# Patient Record
Sex: FEMALE | Race: WHITE | NOT HISPANIC OR LATINO
[De-identification: names, ages, dates, MRNs, and addresses within clinical notes are randomized per-mention and may not be internally consistent; named-entity substitution may affect disease eponyms.]

---

## 2019-11-02 ENCOUNTER — APPOINTMENT (OUTPATIENT)
Dept: ORTHOPEDIC SURGERY | Facility: CLINIC | Age: 64
End: 2019-11-02

## 2020-12-03 ENCOUNTER — TRANSCRIPTION ENCOUNTER (OUTPATIENT)
Age: 65
End: 2020-12-03

## 2020-12-16 ENCOUNTER — TRANSCRIPTION ENCOUNTER (OUTPATIENT)
Age: 65
End: 2020-12-16

## 2021-01-25 ENCOUNTER — APPOINTMENT (OUTPATIENT)
Dept: DISASTER EMERGENCY | Facility: CLINIC | Age: 66
End: 2021-01-25

## 2021-01-27 LAB — SARS-COV-2 N GENE NPH QL NAA+PROBE: NOT DETECTED

## 2021-02-13 ENCOUNTER — TRANSCRIPTION ENCOUNTER (OUTPATIENT)
Age: 66
End: 2021-02-13

## 2021-03-04 ENCOUNTER — TRANSCRIPTION ENCOUNTER (OUTPATIENT)
Age: 66
End: 2021-03-04

## 2021-09-13 ENCOUNTER — APPOINTMENT (OUTPATIENT)
Dept: ULTRASOUND IMAGING | Facility: CLINIC | Age: 66
End: 2021-09-13
Payer: MEDICARE

## 2021-09-13 ENCOUNTER — APPOINTMENT (OUTPATIENT)
Dept: MAMMOGRAPHY | Facility: CLINIC | Age: 66
End: 2021-09-13
Payer: MEDICARE

## 2021-09-13 PROCEDURE — 77067 SCR MAMMO BI INCL CAD: CPT

## 2021-09-13 PROCEDURE — 77063 BREAST TOMOSYNTHESIS BI: CPT

## 2021-09-13 PROCEDURE — 76641 ULTRASOUND BREAST COMPLETE: CPT | Mod: 50

## 2021-09-25 ENCOUNTER — TRANSCRIPTION ENCOUNTER (OUTPATIENT)
Age: 66
End: 2021-09-25

## 2022-09-07 ENCOUNTER — APPOINTMENT (OUTPATIENT)
Dept: ULTRASOUND IMAGING | Facility: CLINIC | Age: 67
End: 2022-09-07

## 2022-09-07 PROCEDURE — 76856 US EXAM PELVIC COMPLETE: CPT

## 2022-09-07 PROCEDURE — 76830 TRANSVAGINAL US NON-OB: CPT

## 2022-09-08 ENCOUNTER — APPOINTMENT (OUTPATIENT)
Dept: MAMMOGRAPHY | Facility: CLINIC | Age: 67
End: 2022-09-08

## 2022-09-08 ENCOUNTER — APPOINTMENT (OUTPATIENT)
Dept: ULTRASOUND IMAGING | Facility: CLINIC | Age: 67
End: 2022-09-08

## 2022-09-08 ENCOUNTER — APPOINTMENT (OUTPATIENT)
Dept: RADIOLOGY | Facility: CLINIC | Age: 67
End: 2022-09-08

## 2022-09-08 PROCEDURE — 77067 SCR MAMMO BI INCL CAD: CPT

## 2022-09-08 PROCEDURE — 77080 DXA BONE DENSITY AXIAL: CPT

## 2022-09-08 PROCEDURE — 77063 BREAST TOMOSYNTHESIS BI: CPT

## 2022-09-08 PROCEDURE — 76641 ULTRASOUND BREAST COMPLETE: CPT | Mod: 50

## 2022-11-22 ENCOUNTER — APPOINTMENT (OUTPATIENT)
Dept: ORTHOPEDIC SURGERY | Facility: CLINIC | Age: 67
End: 2022-11-22

## 2022-11-22 PROCEDURE — 99214 OFFICE O/P EST MOD 30 MIN: CPT

## 2022-11-22 PROCEDURE — 99204 OFFICE O/P NEW MOD 45 MIN: CPT

## 2022-11-22 NOTE — HISTORY OF PRESENT ILLNESS
[de-identified] : L medial foot/arch painx 3 months.  Wose with prolonged standing.  Saw podiatrist (Aislinn) who placed in a boot for 6 weeks. and now with orthotics [] : Post Surgical Visit: no [FreeTextEntry1] : L foot

## 2022-11-22 NOTE — PHYSICAL EXAM
[Left] : left foot and ankle [NL (40)] : plantar flexion 40 degrees [NL 30)] : inversion 30 degrees [NL (20)] : eversion 20 degrees [5___] : inversion 5[unfilled]/5 [2+] : dorsalis pedis pulse: 2+ [] : non-antalgic

## 2022-11-22 NOTE — DATA REVIEWED
[Left] : left [Foot] : foot [Report was reviewed and noted in the chart] : The report was reviewed and noted in the chart [I reviewed the films/CD and agree] : I reviewed the films/CD and agree [FreeTextEntry1] : 11/6/22  tob post tensinosis, ant tibial tendinosis

## 2023-01-03 ENCOUNTER — APPOINTMENT (OUTPATIENT)
Dept: ORTHOPEDIC SURGERY | Facility: CLINIC | Age: 68
End: 2023-01-03
Payer: MEDICARE

## 2023-01-03 PROCEDURE — 99213 OFFICE O/P EST LOW 20 MIN: CPT

## 2023-01-03 RX ORDER — LIDOCAINE 5% 700 MG/1
5 PATCH TOPICAL
Qty: 30 | Refills: 1 | Status: ACTIVE | COMMUNITY
Start: 2023-01-03 | End: 1900-01-01

## 2023-01-03 NOTE — HISTORY OF PRESENT ILLNESS
[5] : 5 [4] : 4 [Dull/Aching] : dull/aching [Localized] : localized [Intermittent] : intermittent [Leisure] : leisure [Rest] : rest [Sitting] : sitting [Exercising] : exercising [de-identified] : 11/22/22: L medial foot/arch painx 3 months.  Wose with prolonged standing.  Saw podiatrist (Aislinn) who placed in a boot for 6 weeks. and now with orthotics\par 1/3/23: F/U L Foot- Pt reports overall improvement (75%) with some pain/burning along the medial foot (inversion). PT has helped greatly. Using orthotics, but feels they are bothering her. [] : Post Surgical Visit: no [FreeTextEntry1] : L foot

## 2023-01-03 NOTE — PHYSICAL EXAM
[Left] : left foot and ankle [NL (40)] : plantar flexion 40 degrees [NL 30)] : inversion 30 degrees [NL (20)] : eversion 20 degrees [2+] : dorsalis pedis pulse: 2+ [5___] : eversion 5[unfilled]/5 [] : non-antalgic [de-identified] : Painful inversion/eversion

## 2023-01-03 NOTE — DISCUSSION/SUMMARY
[de-identified] : Will continue PT/HEP as she is making gains \par Rx for lidoderm patches\par orthotic modification \par f/u 6 weeks or prn

## 2023-01-04 ENCOUNTER — FORM ENCOUNTER (OUTPATIENT)
Age: 68
End: 2023-01-04

## 2023-02-28 ENCOUNTER — APPOINTMENT (OUTPATIENT)
Dept: ORTHOPEDIC SURGERY | Facility: CLINIC | Age: 68
End: 2023-02-28
Payer: MEDICARE

## 2023-02-28 PROCEDURE — 99213 OFFICE O/P EST LOW 20 MIN: CPT

## 2023-02-28 RX ORDER — DICLOFENAC SODIUM 1 %
1 KIT TOPICAL
Qty: 1 | Refills: 2 | Status: ACTIVE | COMMUNITY
Start: 2023-02-28 | End: 1900-01-01

## 2023-02-28 NOTE — HISTORY OF PRESENT ILLNESS
[5] : 5 [4] : 4 [Dull/Aching] : dull/aching [Localized] : localized [Intermittent] : intermittent [Leisure] : leisure [Rest] : rest [Sitting] : sitting [Exercising] : exercising [de-identified] : 11/22/22: L medial foot/arch painx 3 months.  Wose with prolonged standing.  Saw podiatrist (Aislinn) who placed in a boot for 6 weeks. and now with orthotics\par 1/3/23: F/U L Foot- Pt reports overall improvement (75%) with some pain/burning along the medial foot (inversion). PT has helped greatly. Using orthotics, but feels they are bothering her.\par 2/28/23: F/U L foot- Reports improvement overall. WB in sneakers w/ orthotics being adjusted/redone [] : Post Surgical Visit: no [FreeTextEntry1] : L foot

## 2023-02-28 NOTE — PHYSICAL EXAM
[Left] : left foot and ankle [NL (40)] : plantar flexion 40 degrees [NL 30)] : inversion 30 degrees [NL (20)] : eversion 20 degrees [5___] : eversion 5[unfilled]/5 [2+] : dorsalis pedis pulse: 2+ [] : non-antalgic [de-identified] : Painful inversion/eversion

## 2023-08-17 ENCOUNTER — NON-APPOINTMENT (OUTPATIENT)
Age: 68
End: 2023-08-17

## 2023-08-21 ENCOUNTER — APPOINTMENT (OUTPATIENT)
Dept: INTERNAL MEDICINE | Facility: CLINIC | Age: 68
End: 2023-08-21
Payer: MEDICARE

## 2023-08-21 ENCOUNTER — NON-APPOINTMENT (OUTPATIENT)
Age: 68
End: 2023-08-21

## 2023-08-21 VITALS
OXYGEN SATURATION: 98 % | HEIGHT: 63 IN | TEMPERATURE: 97.2 F | BODY MASS INDEX: 23.92 KG/M2 | WEIGHT: 135 LBS | HEART RATE: 60 BPM | SYSTOLIC BLOOD PRESSURE: 116 MMHG | DIASTOLIC BLOOD PRESSURE: 72 MMHG

## 2023-08-21 DIAGNOSIS — M65.9 SYNOVITIS AND TENOSYNOVITIS, UNSPECIFIED: ICD-10-CM

## 2023-08-21 DIAGNOSIS — Z23 ENCOUNTER FOR IMMUNIZATION: ICD-10-CM

## 2023-08-21 DIAGNOSIS — K21.9 GASTRO-ESOPHAGEAL REFLUX DISEASE W/OUT ESOPHAGITIS: ICD-10-CM

## 2023-08-21 DIAGNOSIS — Z78.9 OTHER SPECIFIED HEALTH STATUS: ICD-10-CM

## 2023-08-21 DIAGNOSIS — Z01.818 ENCOUNTER FOR OTHER PREPROCEDURAL EXAMINATION: ICD-10-CM

## 2023-08-21 DIAGNOSIS — Z82.49 FAMILY HISTORY OF ISCHEMIC HEART DISEASE AND OTHER DISEASES OF THE CIRCULATORY SYSTEM: ICD-10-CM

## 2023-08-21 PROCEDURE — 99204 OFFICE O/P NEW MOD 45 MIN: CPT

## 2023-08-21 RX ORDER — LOSARTAN POTASSIUM 25 MG/1
25 TABLET, FILM COATED ORAL DAILY
Qty: 90 | Refills: 3 | Status: ACTIVE | COMMUNITY
Start: 2023-08-21 | End: 1900-01-01

## 2023-08-21 RX ORDER — METOPROLOL SUCCINATE 50 MG/1
50 TABLET, EXTENDED RELEASE ORAL
Refills: 0 | Status: ACTIVE | COMMUNITY

## 2023-08-21 NOTE — ASSESSMENT
[FreeTextEntry1] : Cough postviral rec flovent x 2w. if not helpful will tx gerd given hx and throat discomfort with pepcid. will avoid albuterol given afib afib cont metoprolol,eliquis send cardio records htn at goal cont losartan due for mammo/us due for mra head given fhx aneurysm  due for tdap at pharmacy labs today utd derm ophtho dental  utd dexa, colo

## 2023-08-21 NOTE — HISTORY OF PRESENT ILLNESS
[FreeTextEntry8] : Cough, dry.  Went to ENT took amox, qnasl, [place], singulair, astelin, xlear, medrol  got sick w bronchitis with fever 100 7/13  cough is dry and improved. very occ pain with coughing.  + pnd doing qnasl still doing singulair no sob cp fever  worse at night.  no gerd but does note hx gerd and does have throat feeling occ sore.  fhx cerebral aneurysm, ruptured, in mom and brother screning q5h now due utd derm ophtho dental  bl knee replacements 5y ago  dexa 2022 osteopenia colo 2021 due again 3y mammogram last was in october  gyn had pap takes cream goes q6m  3 c sections shoulder rotator cuff 25y ago  lives with  sees cardiology jaleesa carnes.  got shingrix prevnar and covid.  tdap due  sees dentist marci  works out with  twice a week. third day cardio. pilates, yoga.   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

## 2023-08-22 LAB
25(OH)D3 SERPL-MCNC: 62 NG/ML
ALBUMIN SERPL ELPH-MCNC: 4.5 G/DL
ALP BLD-CCNC: 90 U/L
ALT SERPL-CCNC: 13 U/L
ANION GAP SERPL CALC-SCNC: 17 MMOL/L
AST SERPL-CCNC: 17 U/L
BILIRUB SERPL-MCNC: 0.6 MG/DL
BUN SERPL-MCNC: 20 MG/DL
CALCIUM SERPL-MCNC: 9.7 MG/DL
CHLORIDE SERPL-SCNC: 102 MMOL/L
CHOLEST SERPL-MCNC: 208 MG/DL
CO2 SERPL-SCNC: 22 MMOL/L
CREAT SERPL-MCNC: 0.67 MG/DL
EGFR: 95 ML/MIN/1.73M2
ESTIMATED AVERAGE GLUCOSE: 117 MG/DL
GLUCOSE SERPL-MCNC: 71 MG/DL
HBA1C MFR BLD HPLC: 5.7 %
HDLC SERPL-MCNC: 72 MG/DL
LDLC SERPL CALC-MCNC: 120 MG/DL
NONHDLC SERPL-MCNC: 136 MG/DL
POTASSIUM SERPL-SCNC: 4 MMOL/L
PROT SERPL-MCNC: 6.9 G/DL
SODIUM SERPL-SCNC: 141 MMOL/L
TRIGL SERPL-MCNC: 91 MG/DL
TSH SERPL-ACNC: 0.74 UIU/ML

## 2023-08-22 RX ORDER — FLUTICASONE PROPIONATE 110 UG/1
110 AEROSOL, METERED RESPIRATORY (INHALATION)
Qty: 1 | Refills: 1 | Status: DISCONTINUED | COMMUNITY
Start: 2023-08-21 | End: 2023-08-22

## 2023-08-22 RX ORDER — BUDESONIDE 180 UG/1
180 AEROSOL, POWDER RESPIRATORY (INHALATION)
Qty: 1 | Refills: 2 | Status: ACTIVE | COMMUNITY
Start: 2023-08-22 | End: 1900-01-01

## 2023-09-04 ENCOUNTER — TRANSCRIPTION ENCOUNTER (OUTPATIENT)
Age: 68
End: 2023-09-04

## 2023-09-06 ENCOUNTER — APPOINTMENT (OUTPATIENT)
Dept: ULTRASOUND IMAGING | Facility: CLINIC | Age: 68
End: 2023-09-06
Payer: MEDICARE

## 2023-09-06 ENCOUNTER — RESULT REVIEW (OUTPATIENT)
Age: 68
End: 2023-09-06

## 2023-09-06 ENCOUNTER — APPOINTMENT (OUTPATIENT)
Dept: MAMMOGRAPHY | Facility: CLINIC | Age: 68
End: 2023-09-06
Payer: MEDICARE

## 2023-09-06 PROCEDURE — 77063 BREAST TOMOSYNTHESIS BI: CPT

## 2023-09-06 PROCEDURE — 76641 ULTRASOUND BREAST COMPLETE: CPT | Mod: 50,GY

## 2023-09-06 PROCEDURE — 77067 SCR MAMMO BI INCL CAD: CPT

## 2023-10-13 ENCOUNTER — NON-APPOINTMENT (OUTPATIENT)
Age: 68
End: 2023-10-13

## 2023-12-04 ENCOUNTER — APPOINTMENT (OUTPATIENT)
Dept: INTERNAL MEDICINE | Facility: CLINIC | Age: 68
End: 2023-12-04

## 2023-12-14 LAB
CHOLEST SERPL-MCNC: 188 MG/DL
HDLC SERPL-MCNC: 69 MG/DL
LDLC SERPL CALC-MCNC: 106 MG/DL
NONHDLC SERPL-MCNC: 120 MG/DL
TRIGL SERPL-MCNC: 71 MG/DL

## 2023-12-18 ENCOUNTER — OUTPATIENT (OUTPATIENT)
Dept: OUTPATIENT SERVICES | Facility: HOSPITAL | Age: 68
LOS: 1 days | End: 2023-12-18
Payer: MEDICARE

## 2023-12-18 ENCOUNTER — APPOINTMENT (OUTPATIENT)
Dept: INTERNAL MEDICINE | Facility: CLINIC | Age: 68
End: 2023-12-18
Payer: MEDICARE

## 2023-12-18 VITALS
OXYGEN SATURATION: 98 % | SYSTOLIC BLOOD PRESSURE: 122 MMHG | HEART RATE: 60 BPM | BODY MASS INDEX: 23.38 KG/M2 | WEIGHT: 132 LBS | DIASTOLIC BLOOD PRESSURE: 64 MMHG

## 2023-12-18 DIAGNOSIS — M81.0 AGE-RELATED OSTEOPOROSIS W/OUT CURRENT PATHOLOGICAL FRACTURE: ICD-10-CM

## 2023-12-18 DIAGNOSIS — Z00.00 ENCOUNTER FOR GENERAL ADULT MEDICAL EXAMINATION W/OUT ABNORMAL FINDINGS: ICD-10-CM

## 2023-12-18 DIAGNOSIS — F41.9 ANXIETY DISORDER, UNSPECIFIED: ICD-10-CM

## 2023-12-18 DIAGNOSIS — I10 ESSENTIAL (PRIMARY) HYPERTENSION: ICD-10-CM

## 2023-12-18 DIAGNOSIS — Z00.00 ENCOUNTER FOR GENERAL ADULT MEDICAL EXAMINATION WITHOUT ABNORMAL FINDINGS: ICD-10-CM

## 2023-12-18 DIAGNOSIS — J45.998 OTHER ASTHMA: ICD-10-CM

## 2023-12-18 DIAGNOSIS — M81.0 AGE-RELATED OSTEOPOROSIS WITHOUT CURRENT PATHOLOGICAL FRACTURE: ICD-10-CM

## 2023-12-18 DIAGNOSIS — I48.91 UNSPECIFIED ATRIAL FIBRILLATION: ICD-10-CM

## 2023-12-18 DIAGNOSIS — Z87.898 PERSONAL HISTORY OF OTHER SPECIFIED CONDITIONS: ICD-10-CM

## 2023-12-18 PROCEDURE — G0439: CPT

## 2023-12-18 RX ORDER — ALPRAZOLAM 0.25 MG/1
0.25 TABLET ORAL
Qty: 2 | Refills: 1 | Status: ACTIVE | COMMUNITY
Start: 2023-12-18 | End: 1900-01-01

## 2023-12-18 NOTE — HISTORY OF PRESENT ILLNESS
[de-identified] : fhx cerebral aneurysm, ruptured, in mom and brother screning q5h now due afib taking eliquis, no bleeding , metoprolol. sees cardiology. had  2 episodes. 24-48h after covid shot. felt palpitations. had another episode htn taking losartan does not check bp at home  utd derm ophtho dental dexa 2022 osteopenia colo 2021 due again 3y mammogram last was in october dexa due this year; hx osteoporosis now on drug holiday. gyn had pap takes cream goes q6m sees cardiology jaleesa carnes. got shingrix prevnar covid. tdap. flu shot. due for rsv.  works out with  twice a week. third day cardio. pilates, yoga.

## 2023-12-18 NOTE — ASSESSMENT
[FreeTextEntry1] : fhx cerebral aneurysm, ruptured, in mom and brother screning q5h now due afib cont metop, eliquis, cardiology fu check bp monthly. .  utd derm ophtho dental dexa 2022 osteopenia due in 2024 colo 2021 due again 3y mammogram last was in october and normal gyn had pap takes cream goes q6m sees cardiology jaleesa carnes. got shingrix prevnar covid. tdap. flu shot. due for rsv.  cont exercise xanax before mri as per pt request

## 2023-12-18 NOTE — HEALTH RISK ASSESSMENT
[No falls in past year] : Patient reported no falls in the past year [0] : 2) Feeling down, depressed, or hopeless: Not at all (0) [PHQ-2 Negative - No further assessment needed] : PHQ-2 Negative - No further assessment needed [[id number]] : 0 [With Patient/Caregiver] : , with patient/caregiver [Reviewed updated] : Reviewed, updated [Designated Healthcare Proxy] : Designated healthcare proxy [AdvancecareDate] : 12/23 [Never] : Never

## 2024-09-09 ENCOUNTER — APPOINTMENT (OUTPATIENT)
Dept: INTERNAL MEDICINE | Facility: CLINIC | Age: 69
End: 2024-09-09
Payer: MEDICARE

## 2024-09-09 VITALS
DIASTOLIC BLOOD PRESSURE: 72 MMHG | WEIGHT: 135 LBS | HEIGHT: 63 IN | HEART RATE: 57 BPM | OXYGEN SATURATION: 98 % | BODY MASS INDEX: 23.92 KG/M2 | TEMPERATURE: 97.3 F | RESPIRATION RATE: 16 BRPM | SYSTOLIC BLOOD PRESSURE: 135 MMHG

## 2024-09-09 DIAGNOSIS — Z00.00 ENCOUNTER FOR GENERAL ADULT MEDICAL EXAMINATION W/OUT ABNORMAL FINDINGS: ICD-10-CM

## 2024-09-09 DIAGNOSIS — I48.91 UNSPECIFIED ATRIAL FIBRILLATION: ICD-10-CM

## 2024-09-09 DIAGNOSIS — F51.04 PSYCHOPHYSIOLOGIC INSOMNIA: ICD-10-CM

## 2024-09-09 DIAGNOSIS — I10 ESSENTIAL (PRIMARY) HYPERTENSION: ICD-10-CM

## 2024-09-09 DIAGNOSIS — N90.4 LEUKOPLAKIA OF VULVA: ICD-10-CM

## 2024-09-09 PROCEDURE — 99215 OFFICE O/P EST HI 40 MIN: CPT

## 2024-09-09 RX ORDER — APIXABAN 5 MG/1
5 TABLET, FILM COATED ORAL
Qty: 180 | Refills: 3 | Status: ACTIVE | COMMUNITY
Start: 2024-09-09 | End: 1900-01-01

## 2024-09-09 NOTE — ASSESSMENT
[FreeTextEntry1] : fhx cerebral aneurysm, ruptured, in mom and brother screning q5h now due afib at goal cont carido fu check labs. avoid triggers htn at goal monitor at home  utd derm ophtho dental dexa due script given colo 2021 due again 5y mammogram due in oct gyn had pap takes cream goes q6m for lichen sclerosis sees cardiology jaleesa carnes. got shingrix prevnar covid due. tdap 2023. flu shot due. due for rsv.  works out with  twice a week. third day cardio. pilates, yoga. discussed sleep hygine

## 2024-09-09 NOTE — HISTORY OF PRESENT ILLNESS
[de-identified] :  just finished cancer treatment so she's been through a lot.  fhx cerebral aneurysm, ruptured, in mom and brother screning q5h now due afib sees dr carnes taking eliquis, no bleeding , metoprolol. sees cardiology. had 3 episodes. 24-48h after covid shot. felt palpitations. had another episode august increased metoprolol to 50mg xl daily htn taking metop taking losartan 25mg  utd derm ophtho dental dexa 2022 osteopenia colo 2021 due again 3y mammogram due in oct dexa due this year; hx osteoporosis now on drug holiday. will go for dexa.  gyn had pap takes cream goes q6m sees cardiology jaleesa carnes. got shingrix prevnar covid due. tdap 2023. flu shot due. due for rsv.  works out with  twice a week. third day cardio. pilates, yoga. trouble sleeping tv on at night.

## 2024-09-10 LAB
25(OH)D3 SERPL-MCNC: 44.7 NG/ML
ALBUMIN SERPL ELPH-MCNC: 4.6 G/DL
ALP BLD-CCNC: 93 U/L
ALT SERPL-CCNC: 10 U/L
ANION GAP SERPL CALC-SCNC: 10 MMOL/L
AST SERPL-CCNC: 15 U/L
BILIRUB SERPL-MCNC: 0.6 MG/DL
BUN SERPL-MCNC: 19 MG/DL
CALCIUM SERPL-MCNC: 9.8 MG/DL
CHLORIDE SERPL-SCNC: 102 MMOL/L
CHOLEST SERPL-MCNC: 186 MG/DL
CO2 SERPL-SCNC: 28 MMOL/L
CREAT SERPL-MCNC: 0.69 MG/DL
EGFR: 93 ML/MIN/1.73M2
ESTIMATED AVERAGE GLUCOSE: 108 MG/DL
GLUCOSE SERPL-MCNC: 98 MG/DL
HBA1C MFR BLD HPLC: 5.4 %
HCT VFR BLD CALC: 41.9 %
HDLC SERPL-MCNC: 68 MG/DL
HGB BLD-MCNC: 13.5 G/DL
LDLC SERPL CALC-MCNC: 100 MG/DL
MCHC RBC-ENTMCNC: 30.8 PG
MCHC RBC-ENTMCNC: 32.2 GM/DL
MCV RBC AUTO: 95.7 FL
NONHDLC SERPL-MCNC: 118 MG/DL
PLATELET # BLD AUTO: 224 K/UL
POTASSIUM SERPL-SCNC: 4.6 MMOL/L
PROT SERPL-MCNC: 7 G/DL
RBC # BLD: 4.38 M/UL
RBC # FLD: 12.4 %
SODIUM SERPL-SCNC: 140 MMOL/L
TRIGL SERPL-MCNC: 102 MG/DL
TSH SERPL-ACNC: 0.67 UIU/ML
WBC # FLD AUTO: 4.65 K/UL

## 2024-10-04 ENCOUNTER — APPOINTMENT (OUTPATIENT)
Dept: RADIOLOGY | Facility: CLINIC | Age: 69
End: 2024-10-04
Payer: MEDICARE

## 2024-10-04 ENCOUNTER — RESULT REVIEW (OUTPATIENT)
Age: 69
End: 2024-10-04

## 2024-10-04 ENCOUNTER — APPOINTMENT (OUTPATIENT)
Dept: MAMMOGRAPHY | Facility: CLINIC | Age: 69
End: 2024-10-04
Payer: MEDICARE

## 2024-10-04 ENCOUNTER — APPOINTMENT (OUTPATIENT)
Dept: ULTRASOUND IMAGING | Facility: CLINIC | Age: 69
End: 2024-10-04
Payer: MEDICARE

## 2024-10-04 PROCEDURE — 76641 ULTRASOUND BREAST COMPLETE: CPT | Mod: 50,GA

## 2024-10-04 PROCEDURE — 77063 BREAST TOMOSYNTHESIS BI: CPT

## 2024-10-04 PROCEDURE — 77085 DXA BONE DENSITY AXL VRT FX: CPT

## 2024-10-04 PROCEDURE — 77067 SCR MAMMO BI INCL CAD: CPT

## 2024-10-11 ENCOUNTER — RX RENEWAL (OUTPATIENT)
Age: 69
End: 2024-10-11

## 2024-12-06 ENCOUNTER — APPOINTMENT (OUTPATIENT)
Dept: INTERNAL MEDICINE | Facility: CLINIC | Age: 69
End: 2024-12-06

## 2024-12-07 ENCOUNTER — APPOINTMENT (OUTPATIENT)
Dept: MRI IMAGING | Facility: CLINIC | Age: 69
End: 2024-12-07

## 2024-12-07 PROCEDURE — 70546 MR ANGIOGRAPH HEAD W/O&W/DYE: CPT | Mod: 76

## 2024-12-07 PROCEDURE — A9585: CPT

## 2024-12-09 ENCOUNTER — APPOINTMENT (OUTPATIENT)
Dept: INTERNAL MEDICINE | Facility: CLINIC | Age: 69
End: 2024-12-09
Payer: MEDICARE

## 2024-12-09 VITALS
TEMPERATURE: 98.1 F | BODY MASS INDEX: 23.39 KG/M2 | DIASTOLIC BLOOD PRESSURE: 74 MMHG | WEIGHT: 132 LBS | HEIGHT: 63 IN | OXYGEN SATURATION: 98 % | HEART RATE: 67 BPM | SYSTOLIC BLOOD PRESSURE: 134 MMHG

## 2024-12-09 DIAGNOSIS — F51.04 PSYCHOPHYSIOLOGIC INSOMNIA: ICD-10-CM

## 2024-12-09 DIAGNOSIS — I10 ESSENTIAL (PRIMARY) HYPERTENSION: ICD-10-CM

## 2024-12-09 DIAGNOSIS — Z00.00 ENCOUNTER FOR GENERAL ADULT MEDICAL EXAMINATION W/OUT ABNORMAL FINDINGS: ICD-10-CM

## 2024-12-09 DIAGNOSIS — I48.91 UNSPECIFIED ATRIAL FIBRILLATION: ICD-10-CM

## 2024-12-09 PROCEDURE — G0439: CPT

## 2025-02-03 DIAGNOSIS — M25.551 PAIN IN RIGHT HIP: ICD-10-CM
